# Patient Record
Sex: FEMALE | Race: WHITE | NOT HISPANIC OR LATINO | Employment: FULL TIME | URBAN - METROPOLITAN AREA
[De-identification: names, ages, dates, MRNs, and addresses within clinical notes are randomized per-mention and may not be internally consistent; named-entity substitution may affect disease eponyms.]

---

## 2024-02-02 ENCOUNTER — OFFICE VISIT (OUTPATIENT)
Dept: PRIMARY CARE | Facility: CLINIC | Age: 64
End: 2024-02-02
Payer: COMMERCIAL

## 2024-02-02 VITALS
SYSTOLIC BLOOD PRESSURE: 118 MMHG | HEART RATE: 65 BPM | WEIGHT: 120 LBS | BODY MASS INDEX: 20.49 KG/M2 | DIASTOLIC BLOOD PRESSURE: 78 MMHG | HEIGHT: 64 IN

## 2024-02-02 DIAGNOSIS — Z00.00 ANNUAL PHYSICAL EXAM: Primary | ICD-10-CM

## 2024-02-02 DIAGNOSIS — M85.80 OSTEOPENIA, UNSPECIFIED LOCATION: ICD-10-CM

## 2024-02-02 DIAGNOSIS — Z86.010 HISTORY OF COLON POLYPS: ICD-10-CM

## 2024-02-02 DIAGNOSIS — Z12.31 SCREENING MAMMOGRAM FOR BREAST CANCER: ICD-10-CM

## 2024-02-02 DIAGNOSIS — Z12.11 ENCOUNTER FOR SCREENING FOR MALIGNANT NEOPLASM OF COLON: ICD-10-CM

## 2024-02-02 DIAGNOSIS — Z13.6 ENCOUNTER FOR SCREENING FOR CORONARY ARTERY DISEASE: ICD-10-CM

## 2024-02-02 PROCEDURE — 99386 PREV VISIT NEW AGE 40-64: CPT | Performed by: INTERNAL MEDICINE

## 2024-02-02 PROCEDURE — 1036F TOBACCO NON-USER: CPT | Performed by: INTERNAL MEDICINE

## 2024-02-02 ASSESSMENT — ENCOUNTER SYMPTOMS
LOSS OF SENSATION IN FEET: 0
DEPRESSION: 0
OCCASIONAL FEELINGS OF UNSTEADINESS: 0

## 2024-02-02 NOTE — PROGRESS NOTES
"Subjective   Patient ID: Daisy Man is a 63 y.o. female who presents for Annual Exam.    HPI   Patient is a 63-year-old  female who comes to establish primary care.  She is due for an annual wellness exam, lab work, mammogram as well as colonoscopy.  She brings her past immunization records from [place] and these will be reviewed and updated in the medical record.  Colonoscopy done in 2/2019 - polypectomy done and 3 year F/U was recommended.  Patient is agreeable to getting CT cardiac scoring for screening purposes.  She lives with her , eats a healthy diet and remains active.  Pt denies, fever, chills, CP, SOB, abdominal pain, N/V/D/C or  symptoms. No HA, dizziness, numbness or weakness.  No unintentional loss of weight, loss of appetite, melena, rectal bleeding.  Patient did have some anxiety issues a couple years ago during the pandemic which she does not believe this is a significant issue or requires any drug treatment.  Sleep is variable but not a significant problem and patient declines drug therapy.  Review of Systems  As per HPI. S/P hysterectomy for fibroids 11 years ago.  Father has H/O CAD, S/P stent.  Objective   /78 (BP Location: Right arm, Patient Position: Sitting, BP Cuff Size: Large adult)   Pulse 65   Ht 1.626 m (5' 4\")   Wt 54.4 kg (120 lb)   BMI 20.60 kg/m²     Physical Exam  General - Patient is a well developed, well appearing,small built, middle aged CF in no acute respiratory distress  Head - normocephalic and atraumatic  Eyes - no pallor or icterus and normal extraocular movements  ENT - normal EACs and TMs, throat clear with no exudates  Neck - supple, no JVD, thyromegaly or lymphadenopathy  Heart - normal rate and rhythm with no murmurs  Lungs - clear to auscultation bilaterally  Breasts - no masses felt in either breast and nipples are normal  Abdomen - soft, non-tender, normal BS with no masses or organomegaly  Extremities - no pedal edema  Skin - " no rashes or ulcers  Neuro - grossly normal with no focal neurological deficits  Psych - normal mental status, mood and affect  MSK - normal gait with grossly normal ROM of major joints   Assessment/Plan        1.  Annual wellness exam-routine labs, colonoscopy, mammogram and bone DEXA have been ordered, patient is status post hysterectomy and does not get Pap smears anymore  2.  History of colon polyps-colonoscopy has been ordered  3.  Screening for CAD-CT cardiac scoring ordered  4.  History of fracture of the foot, patient has had low bone mass on previous DEXA-vitamin D 25-hydroxy level and repeat bone DEXA will be ordered  Follow-up in 1 year.  32 minutes spent rooming the patient, reviewing records, eliciting history, examining patient, counseling, coordination of care and in documentation.  This note was partially generated using the Dragon voice recognition system. There may be some incorrect words, spelling and punctuation errors that were not corrected prior to committing the note to the patient's medical record.

## 2024-02-05 ENCOUNTER — LAB (OUTPATIENT)
Dept: LAB | Facility: LAB | Age: 64
End: 2024-02-05
Payer: COMMERCIAL

## 2024-02-05 DIAGNOSIS — M85.80 OSTEOPENIA, UNSPECIFIED LOCATION: ICD-10-CM

## 2024-02-05 DIAGNOSIS — Z00.00 ANNUAL PHYSICAL EXAM: ICD-10-CM

## 2024-02-05 LAB
25(OH)D3 SERPL-MCNC: 48 NG/ML (ref 30–100)
ALBUMIN SERPL BCP-MCNC: 4.5 G/DL (ref 3.4–5)
ALP SERPL-CCNC: 64 U/L (ref 33–136)
ALT SERPL W P-5'-P-CCNC: 15 U/L (ref 7–45)
ANION GAP SERPL CALC-SCNC: 12 MMOL/L (ref 10–20)
APPEARANCE UR: CLEAR
AST SERPL W P-5'-P-CCNC: 16 U/L (ref 9–39)
BILIRUB SERPL-MCNC: 0.6 MG/DL (ref 0–1.2)
BILIRUB UR STRIP.AUTO-MCNC: NEGATIVE MG/DL
BUN SERPL-MCNC: 17 MG/DL (ref 6–23)
CALCIUM SERPL-MCNC: 9.5 MG/DL (ref 8.6–10.6)
CHLORIDE SERPL-SCNC: 107 MMOL/L (ref 98–107)
CHOLEST SERPL-MCNC: 174 MG/DL (ref 0–199)
CHOLESTEROL/HDL RATIO: 2.3
CO2 SERPL-SCNC: 27 MMOL/L (ref 21–32)
COLOR UR: YELLOW
CREAT SERPL-MCNC: 0.77 MG/DL (ref 0.5–1.05)
EGFRCR SERPLBLD CKD-EPI 2021: 88 ML/MIN/1.73M*2
ERYTHROCYTE [DISTWIDTH] IN BLOOD BY AUTOMATED COUNT: 13.2 % (ref 11.5–14.5)
EST. AVERAGE GLUCOSE BLD GHB EST-MCNC: 103 MG/DL
GLUCOSE SERPL-MCNC: 92 MG/DL (ref 74–99)
GLUCOSE UR STRIP.AUTO-MCNC: NEGATIVE MG/DL
HBA1C MFR BLD: 5.2 %
HCT VFR BLD AUTO: 38.1 % (ref 36–46)
HCV AB SER QL: NONREACTIVE
HDLC SERPL-MCNC: 74.7 MG/DL
HGB BLD-MCNC: 12.5 G/DL (ref 12–16)
HIV 1+2 AB+HIV1 P24 AG SERPL QL IA: ABNORMAL
KETONES UR STRIP.AUTO-MCNC: NEGATIVE MG/DL
LDLC SERPL CALC-MCNC: 89 MG/DL
LEUKOCYTE ESTERASE UR QL STRIP.AUTO: NEGATIVE
MCH RBC QN AUTO: 31.1 PG (ref 26–34)
MCHC RBC AUTO-ENTMCNC: 32.8 G/DL (ref 32–36)
MCV RBC AUTO: 95 FL (ref 80–100)
NITRITE UR QL STRIP.AUTO: NEGATIVE
NON HDL CHOLESTEROL: 99 MG/DL (ref 0–149)
NRBC BLD-RTO: 0 /100 WBCS (ref 0–0)
PH UR STRIP.AUTO: 6 [PH]
PLATELET # BLD AUTO: 202 X10*3/UL (ref 150–450)
POTASSIUM SERPL-SCNC: 4.3 MMOL/L (ref 3.5–5.3)
PROT SERPL-MCNC: 6.4 G/DL (ref 6.4–8.2)
PROT UR STRIP.AUTO-MCNC: NEGATIVE MG/DL
RBC # BLD AUTO: 4.02 X10*6/UL (ref 4–5.2)
RBC # UR STRIP.AUTO: NEGATIVE /UL
SODIUM SERPL-SCNC: 142 MMOL/L (ref 136–145)
SP GR UR STRIP.AUTO: 1.01
TRIGL SERPL-MCNC: 54 MG/DL (ref 0–149)
TSH SERPL-ACNC: 1.79 MIU/L (ref 0.44–3.98)
UROBILINOGEN UR STRIP.AUTO-MCNC: <2 MG/DL
VLDL: 11 MG/DL (ref 0–40)
WBC # BLD AUTO: 4.4 X10*3/UL (ref 4.4–11.3)

## 2024-02-05 PROCEDURE — 86803 HEPATITIS C AB TEST: CPT

## 2024-02-05 PROCEDURE — 85027 COMPLETE CBC AUTOMATED: CPT

## 2024-02-05 PROCEDURE — 86703 HIV-1/HIV-2 1 RESULT ANTBDY: CPT

## 2024-02-05 PROCEDURE — 36415 COLL VENOUS BLD VENIPUNCTURE: CPT

## 2024-02-05 PROCEDURE — 80053 COMPREHEN METABOLIC PANEL: CPT

## 2024-02-05 PROCEDURE — 84443 ASSAY THYROID STIM HORMONE: CPT

## 2024-02-05 PROCEDURE — 82306 VITAMIN D 25 HYDROXY: CPT

## 2024-02-05 PROCEDURE — 80061 LIPID PANEL: CPT

## 2024-02-05 PROCEDURE — 83036 HEMOGLOBIN GLYCOSYLATED A1C: CPT

## 2024-02-05 PROCEDURE — 87389 HIV-1 AG W/HIV-1&-2 AB AG IA: CPT

## 2024-02-05 PROCEDURE — 87536 HIV-1 QUANT&REVRSE TRNSCRPJ: CPT

## 2024-02-05 PROCEDURE — 81003 URINALYSIS AUTO W/O SCOPE: CPT

## 2024-02-06 LAB — HIV 1 & 2 AB SERPL IA.RAPID: NEGATIVE

## 2024-02-07 LAB
HIV-INTEGRATED COMMENT: NORMAL
HIV1 RNA # PLAS NAA DL=20: NOT DETECTED {COPIES}/ML
HIV1 RNA SPEC NAA+PROBE-LOG#: NORMAL {LOG_COPIES}/ML

## 2024-02-08 ENCOUNTER — HOSPITAL ENCOUNTER (OUTPATIENT)
Dept: RADIOLOGY | Facility: CLINIC | Age: 64
Discharge: HOME | End: 2024-02-08
Payer: COMMERCIAL

## 2024-02-08 VITALS — WEIGHT: 119.93 LBS | BODY MASS INDEX: 20.47 KG/M2 | HEIGHT: 64 IN

## 2024-02-08 DIAGNOSIS — Z12.31 SCREENING MAMMOGRAM FOR BREAST CANCER: ICD-10-CM

## 2024-02-08 PROCEDURE — 77063 BREAST TOMOSYNTHESIS BI: CPT | Performed by: RADIOLOGY

## 2024-02-08 PROCEDURE — 77067 SCR MAMMO BI INCL CAD: CPT

## 2024-02-08 PROCEDURE — 77067 SCR MAMMO BI INCL CAD: CPT | Performed by: RADIOLOGY

## 2024-02-09 DIAGNOSIS — M81.0 AGE RELATED OSTEOPOROSIS, UNSPECIFIED PATHOLOGICAL FRACTURE PRESENCE: Primary | ICD-10-CM

## 2024-02-09 PROBLEM — M85.80 OSTEOPENIA: Status: ACTIVE | Noted: 2024-02-05

## 2024-02-09 PROBLEM — Z86.0100 HISTORY OF COLONIC POLYPS: Status: ACTIVE | Noted: 2024-02-09

## 2024-02-09 PROBLEM — Z86.010 HISTORY OF COLONIC POLYPS: Status: ACTIVE | Noted: 2024-02-09

## 2024-02-10 ENCOUNTER — HOSPITAL ENCOUNTER (OUTPATIENT)
Dept: RADIOLOGY | Facility: HOSPITAL | Age: 64
Discharge: HOME | End: 2024-02-10
Payer: COMMERCIAL

## 2024-02-10 DIAGNOSIS — Z13.6 ENCOUNTER FOR SCREENING FOR CORONARY ARTERY DISEASE: ICD-10-CM

## 2024-02-10 PROCEDURE — 75571 CT HRT W/O DYE W/CA TEST: CPT

## 2024-02-12 ENCOUNTER — HOSPITAL ENCOUNTER (OUTPATIENT)
Dept: RADIOLOGY | Facility: CLINIC | Age: 64
Discharge: HOME | End: 2024-02-12
Payer: COMMERCIAL

## 2024-02-12 DIAGNOSIS — M85.80 OSTEOPENIA, UNSPECIFIED LOCATION: ICD-10-CM

## 2024-02-12 PROCEDURE — 77080 DXA BONE DENSITY AXIAL: CPT

## 2024-02-12 PROCEDURE — 77080 DXA BONE DENSITY AXIAL: CPT | Performed by: RADIOLOGY

## 2024-03-06 DIAGNOSIS — Z86.010 HISTORY OF COLONIC POLYPS: Primary | ICD-10-CM

## 2024-03-06 RX ORDER — POLYETHYLENE GLYCOL 3350, SODIUM SULFATE ANHYDROUS, SODIUM BICARBONATE, SODIUM CHLORIDE, POTASSIUM CHLORIDE 236; 22.74; 6.74; 5.86; 2.97 G/4L; G/4L; G/4L; G/4L; G/4L
4000 POWDER, FOR SOLUTION ORAL ONCE
Qty: 4000 ML | Refills: 0 | Status: SHIPPED | OUTPATIENT
Start: 2024-03-06 | End: 2024-03-06

## 2024-03-18 ENCOUNTER — APPOINTMENT (OUTPATIENT)
Dept: GASTROENTEROLOGY | Facility: EXTERNAL LOCATION | Age: 64
End: 2024-03-18
Payer: COMMERCIAL

## 2024-04-08 ENCOUNTER — HOSPITAL ENCOUNTER (OUTPATIENT)
Dept: RADIOLOGY | Facility: CLINIC | Age: 64
Discharge: HOME | End: 2024-04-08
Payer: COMMERCIAL

## 2024-04-08 ENCOUNTER — OFFICE VISIT (OUTPATIENT)
Dept: PRIMARY CARE | Facility: CLINIC | Age: 64
End: 2024-04-08
Payer: COMMERCIAL

## 2024-04-08 VITALS
HEART RATE: 76 BPM | DIASTOLIC BLOOD PRESSURE: 70 MMHG | HEIGHT: 64 IN | WEIGHT: 119 LBS | SYSTOLIC BLOOD PRESSURE: 124 MMHG | BODY MASS INDEX: 20.32 KG/M2

## 2024-04-08 DIAGNOSIS — M25.512 ACUTE PAIN OF LEFT SHOULDER: ICD-10-CM

## 2024-04-08 DIAGNOSIS — R07.81 RIB PAIN ON RIGHT SIDE: ICD-10-CM

## 2024-04-08 DIAGNOSIS — M81.0 AGE RELATED OSTEOPOROSIS, UNSPECIFIED PATHOLOGICAL FRACTURE PRESENCE: ICD-10-CM
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
## 2024-04-08 PROCEDURE — 1036F TOBACCO NON-USER: CPT | Performed by: INTERNAL MEDICINE

## 2024-04-08 PROCEDURE — 73030 X-RAY EXAM OF SHOULDER: CPT | Mod: LT

## 2024-04-08 PROCEDURE — 71100 X-RAY EXAM RIBS UNI 2 VIEWS: CPT | Mod: RT

## 2024-04-08 PROCEDURE — 99214 OFFICE O/P EST MOD 30 MIN: CPT | Performed by: INTERNAL MEDICINE

## 2024-04-08 PROCEDURE — 73030 X-RAY EXAM OF SHOULDER: CPT | Mod: LEFT SIDE | Performed by: RADIOLOGY

## 2024-04-08 RX ORDER — MELOXICAM 7.5 MG/1
7.5 TABLET ORAL DAILY
Qty: 30 TABLET | Refills: 1 | Status: SHIPPED | OUTPATIENT
Start: 2024-04-08 | End: 2025-04-08

## 2024-04-08 RX ORDER — LIDOCAINE 50 MG/G
1 PATCH TOPICAL DAILY
Qty: 15 PATCH | Refills: 0 | Status: SHIPPED | OUTPATIENT
Start: 2024-04-08

## 2024-04-08 RX ORDER — POLYETHYLENE GLYCOL-3350 AND ELECTROLYTES 236; 6.74; 5.86; 2.97; 22.74 G/274.31G; G/274.31G; G/274.31G; G/274.31G; G/274.31G
POWDER, FOR SOLUTION ORAL
COMMUNITY
Start: 2024-03-29

## 2024-04-08 NOTE — PROGRESS NOTES
"Subjective   Patient ID: Daisy Man is a 63 y.o. female who presents for Injury (Shoulder and rib. After a fall).    HPI   Tiffanie is a 63-year-old  female who comes today for an acute visit.  Patient was walking her [employer] [place] about a week ago when she had an accidental fall-fell forward and hurt the right side of her ribs as well as left shoulder but did not sustain any injury to face/head.  Patient denies any headaches or LOC.  She has been experiencing pain in the right submammary area anterolaterally as well as in the left shoulder and complains of limited range of motion of the left shoulder as well.  No history of fever, chills, shortness of breath, cough, dizziness, palpitations, abdominal pain, nausea, vomiting, diarrhea.  Patient also wanted to discuss treatment for osteoporosis as per recent bone DEXA.    Review of Systems  As per [facility]  Objective   /70 (BP Location: Right arm, Patient Position: Sitting, BP Cuff Size: Large adult)   Pulse 76   Ht 1.626 m (5' 4\")   Wt 54 kg (119 lb)   BMI 20.43 kg/m²     Physical Exam  General - Patient is a well developed, well appearing,small built, middle aged CF in no acute respiratory distress  Head - normocephalic and atraumatic  Eyes - no pallor or icterus and normal extraocular movements  Heart - normal rate and rhythm with no murmurs  Lungs - clear to auscultation bilaterally  Chest wall-tenderness noted on the right 7th-8th rib area along anterior axillary line, no ecchymosis noted  Abdomen - soft, non-tender, normal BS with no masses or organomegaly  Extremities - no pedal edema  Skin - no rashes or ulcers  Neuro - grossly normal with no focal neurological deficits  Psych - normal mental status, mood and affect  MSK - normal gait, restricted range of motion of left shoulder  Assessment/Plan        1.  Status post accidental fall-no head injury or loss of consciousness  2.  Right-sided rib pain after fall-suspect rib fracture " especially since patient has osteoporosis, x-ray of the right-sided ribs will be checked, topical lidocaine patch will be prescribed  3.  Left shoulder pain since fall-x-ray of the left shoulder has been ordered, meloxicam 7.5 mg daily will be prescribed and patient will be referred to physical therapy to improve range of motion  4.  Osteoporosis-bisphosphonate therapy and side effects of this treatment have been discussed with patient but she is reluctant to start this medication, she will be referred to rheumatology to discuss other options to treat her osteoporosis  30 minutes spent rooming the patient, reviewing records, eliciting history, examining patient, counseling, coordination of care and in documentation.  This note was partially generated using the Dragon voice recognition system. There may be some incorrect words, spelling and punctuation errors that were not corrected prior to committing the note to the patient's medical record.

## 2024-04-10 ENCOUNTER — EVALUATION (OUTPATIENT)
Dept: PHYSICAL THERAPY | Facility: CLINIC | Age: 64
End: 2024-04-10
Payer: COMMERCIAL

## 2024-04-10 DIAGNOSIS — R07.81 RIB PAIN ON RIGHT SIDE: ICD-10-CM

## 2024-04-10 DIAGNOSIS — M25.512 ACUTE PAIN OF LEFT SHOULDER: Primary | ICD-10-CM

## 2024-04-10 PROCEDURE — 97110 THERAPEUTIC EXERCISES: CPT | Mod: GP | Performed by: PHYSICAL THERAPIST

## 2024-04-10 PROCEDURE — 97161 PT EVAL LOW COMPLEX 20 MIN: CPT | Mod: GP | Performed by: PHYSICAL THERAPIST

## 2024-04-10 ASSESSMENT — ENCOUNTER SYMPTOMS
LOSS OF SENSATION IN FEET: 0
DEPRESSION: 0
OCCASIONAL FEELINGS OF UNSTEADINESS: 0

## 2024-04-10 NOTE — PROGRESS NOTES
"Physical Therapy Evaluation    Patient Name: Daisy Man  MRN: [id number]  Today's Date: 4/10/2024  Visit:1  Referred by: Dr. Bryant  Diagnosis:  low levels complexity evaluation  1. Acute pain of left shoulder        PRECAUTIONS:   none  She does not have any personal issues that would interfere with the rehab process.    SUBJECTIVE:  63 y.o. english speaking female who 10 days ago was pulled down to ground by her dog and sustained R) ribs injuries and L) shoulder strain.  Symptoms have improved a lot each day and has full ROM now.  Feels a little weak.    Pain:  0-1/10 left anterior shoulder.  Home Living:  Pt. Lives with her  in their home.  Prior level of function:  No limitations    OBJECTIVE:  Full AROM of left shoulder with mild end range strain of all motions.  Mild ache with Abd in painful arc L) shoulder.  Mild ache and decreaesd strength with resisted Abd and ER of L) shoulder.  (-) Saleem impingement L) shoulder.  (-) apprehension, load and shift, cross body, ACJ shear and compression.  Outcome Measure:  SPADI- 10%g    ASSESSMENT:  R) RC shoulder strain with mild ache and weakness of cuff.  She requires PT to address these issues to allow a full return to ADLs.    TREATMENT:  - Therex:  Supine press  2x15  Side lying ABD  2x15  SL ER   1# 2x15  Bent over EXT   1# 2x15  Prone Scapular retraction  I's  5\" x 10    PATIENT EDUCATION:  HEP    PLAN:   Daily HEP  Rehab potential: good  Plan of care agreement: Y    GOALS:  Active       PT Problem       Pt will be independent with HEP       Start:  04/10/24    Expected End:  05/17/24            Pt will have improved shoulder pain free ROM       Start:  04/10/24    Expected End:  06/28/24            Pt will have improved UE strength       Start:  04/10/24    Expected End:  06/28/24            Pt will be able to use UE with all ADLs       Start:  04/10/24    Expected End:  06/28/24            Pt will have decreased reports of pain by at least 2 " levels using a VAS       Start:  04/10/24    Expected End:  05/17/24

## 2024-04-17 ENCOUNTER — OFFICE VISIT (OUTPATIENT)
Dept: GASTROENTEROLOGY | Facility: EXTERNAL LOCATION | Age: 64
End: 2024-04-17
Payer: COMMERCIAL

## 2024-04-17 DIAGNOSIS — Z12.11 ENCOUNTER FOR SCREENING FOR MALIGNANT NEOPLASM OF COLON: ICD-10-CM

## 2024-04-17 DIAGNOSIS — Z86.010 PERSONAL HISTORY OF COLONIC POLYPS: Primary | ICD-10-CM

## 2024-04-17 PROCEDURE — 45378 DIAGNOSTIC COLONOSCOPY: CPT | Performed by: INTERNAL MEDICINE

## 2024-04-26 ENCOUNTER — APPOINTMENT (OUTPATIENT)
Dept: PHYSICAL THERAPY | Facility: CLINIC | Age: 64
End: 2024-04-26
Payer: COMMERCIAL

## 2024-05-22 ENCOUNTER — TELEPHONE (OUTPATIENT)
Dept: PRIMARY CARE | Facility: CLINIC | Age: 64
End: 2024-05-22

## 2024-05-22 ENCOUNTER — OFFICE VISIT (OUTPATIENT)
Dept: RHEUMATOLOGY | Facility: CLINIC | Age: 64
End: 2024-05-22
Payer: COMMERCIAL

## 2024-05-22 ENCOUNTER — LAB (OUTPATIENT)
Dept: LAB | Facility: LAB | Age: 64
End: 2024-05-22
Payer: COMMERCIAL

## 2024-05-22 VITALS — DIASTOLIC BLOOD PRESSURE: 81 MMHG | SYSTOLIC BLOOD PRESSURE: 130 MMHG | BODY MASS INDEX: 20.08 KG/M2 | WEIGHT: 117 LBS

## 2024-05-22 DIAGNOSIS — M81.0 AGE RELATED OSTEOPOROSIS, UNSPECIFIED PATHOLOGICAL FRACTURE PRESENCE: ICD-10-CM

## 2024-05-22 DIAGNOSIS — M81.0 AGE RELATED OSTEOPOROSIS, UNSPECIFIED PATHOLOGICAL FRACTURE PRESENCE: Primary | ICD-10-CM

## 2024-05-22 LAB
MAGNESIUM SERPL-MCNC: 2.37 MG/DL (ref 1.6–2.4)
PHOSPHATE SERPL-MCNC: 4.3 MG/DL (ref 2.5–4.9)
PROT SERPL-MCNC: 6.7 G/DL (ref 6.4–8.2)
PTH-INTACT SERPL-MCNC: 35.5 PG/ML (ref 18.5–88)

## 2024-05-22 PROCEDURE — 36415 COLL VENOUS BLD VENIPUNCTURE: CPT

## 2024-05-22 PROCEDURE — 84100 ASSAY OF PHOSPHORUS: CPT

## 2024-05-22 PROCEDURE — 84155 ASSAY OF PROTEIN SERUM: CPT

## 2024-05-22 PROCEDURE — 83735 ASSAY OF MAGNESIUM: CPT

## 2024-05-22 PROCEDURE — 83970 ASSAY OF PARATHORMONE: CPT

## 2024-05-22 PROCEDURE — 82523 COLLAGEN CROSSLINKS: CPT

## 2024-05-22 PROCEDURE — 84165 PROTEIN E-PHORESIS SERUM: CPT | Performed by: INTERNAL MEDICINE

## 2024-05-22 PROCEDURE — 1036F TOBACCO NON-USER: CPT | Performed by: INTERNAL MEDICINE

## 2024-05-22 PROCEDURE — 84165 PROTEIN E-PHORESIS SERUM: CPT

## 2024-05-22 PROCEDURE — 99204 OFFICE O/P NEW MOD 45 MIN: CPT | Performed by: INTERNAL MEDICINE

## 2024-05-22 RX ORDER — ALENDRONATE SODIUM 70 MG/1
70 TABLET ORAL
Qty: 12 TABLET | Refills: 3 | Status: SHIPPED | OUTPATIENT
Start: 2024-05-22 | End: 2025-05-22

## 2024-05-22 RX ORDER — ALENDRONATE SODIUM 70 MG/1
70 TABLET ORAL
Qty: 12 TABLET | Refills: 3 | Status: SHIPPED | OUTPATIENT
Start: 2024-05-22 | End: 2024-05-22 | Stop reason: SDUPTHER

## 2024-05-22 NOTE — PROGRESS NOTES
Subjective  . Daisy Man is a 63 y.o. female who presents for New Patient Visit.    HPI.  63-year-old female with history of osteopenia, uterine fibroids s/p hysterectomy referred by primary care physician Dr. Awa Bryant MD in consultation for abnormal bone density.    She stated that she twisted her left ankle and fractured fifth metatarsal in 2018 when she was in [facility].  Bone density was consistent with osteopenia however no medical therapy was suggested.    She stated that she underwent hysterectomy at the age of 40.  Mother sister has severe osteoporosis.  She quit smoking about 6 years ago.  She drinks 3-4 servings of wine in a week.  She drinks 2 cups of coffee.  She takes multivitamin regularly.  She walks half mile per day.    DEXA scan obtained in February 2024 showed L1-L4 BMD of 0.916 g/cm² reflecting a T-score of -2.2, left total femur BMD of 0.684 g/cm² reflecting a T-score of -2.6 and left femoral neck BMD of 0.689 g/cm² reflecting a T-score of -2.5.    FRAX score is reported 18.3% for major osteoporotic fracture and 4.0% for hip fracture.    Patient suggested Fosamax however she has concerns of side effects.    Social history: She is .  He is a .  Family history and surgical history: As mentioned above.    Review of Systems   All other systems reviewed and are negative.    Objective     Blood pressure 130/81, weight 53.1 kg (117 lb).    Physical Exam.  Gen. AAO x3, NAD.  HEENT: No pallor or icterus, PERRLA, EOMI. Oropharynx is clear. MM moist,Parotid glands  not enlarged. No cervical lymphadenopathy .  Skin: No rashes.  Heart: S1, S2/ RRR. No murmurs or gallops.  Lungs: CTA B.  Abdomen: Soft, NT/ND, BS regular.  MSK: No.swelling or tenderness of the  upper or lower extremity joints with full ROM, Neck,spine and Matheus SI with out tenderness.  Neuro: CN II-XII intact. Sensation to touch intact.Strength 5/5 throughout. DTR 2+ and  symmetrical.  Psych:Appropriate mood and behavior  EXT: No edema    Assessment/Plan  . 63-year-old female with history of osteopenia, uterine fibroids s/p hysterectomy referred by primary care physician Dr. Awa Bryant MD in consultation for abnormal bone density.    #1: Postmenopausal osteoporosis.  -Bone density results reviewed and explained to the patient.  -Bisphosphonate mechanism of action and side effect discussed in length.  She is agreeable to begin Fosamax/alendronate.  She will call her primary care physician for prescription.  -Continue calcium and vitamin D.  -Continue weightbearing exercises.  -Labs look for secondary etiologies.  -Repeat DEXA scan in 2 years.     This note was partially generated using the Dragon Voice recognition system. There may be some incorrect wording, spelling and/or spelling errors or punctuation errors that were not corrected prior to committing the note to the medical record.    Problem List Items Addressed This Visit    None  Visit Diagnoses       Age related osteoporosis, unspecified pathological fracture presence    -  Primary    Relevant Orders    N-Telopeptide, Serum    Magnesium    Phosphorus    Parathyroid Hormone, Intact    Serum Protein Electrophoresis                 Active Ambulatory Problems     Diagnosis Date Noted    History of colonic polyps 02/09/2024    Osteopenia 02/05/2024    Acute pain of left shoulder 04/10/2024     Resolved Ambulatory Problems     Diagnosis Date Noted    No Resolved Ambulatory Problems     No Additional Past Medical History       Family History   Problem Relation Name Age of Onset    Heart failure Paternal Grandmother      Breast cancer Neg Hx         Past Surgical History:   Procedure Laterality Date    HYSTERECTOMY         Social History     Tobacco Use   Smoking Status Former    Types: Cigarettes   Smokeless Tobacco Never       Allergies  Patient has no known allergies.    Current Meds  Current Outpatient Medications    Medication Instructions    alendronate (FOSAMAX) 70 mg, oral, Every 7 days, Take in the morning with a full glass of water, on an empty stomach, and do not take anything else by mouth or lie down for the next 30 min.    GaviLyte-G 236-22.74-6.74 -5.86 gram solution     lidocaine (Lidoderm) 5 % patch 1 patch, transdermal, Daily, Remove & discard patch within 12 hours or as directed by MD.    meloxicam (MOBIC) 7.5 mg, oral, Daily        Kasi Hilario MD

## 2024-05-22 NOTE — LETTER
May 22, 2024     Awa Bryant MD  [address]  [facility], [facility] [address]  [facility] [address]    Patient: Daisy Man   YOB: 1960   Date of Visit: 5/22/2024       Dear Dr. Awa Bryant MD:    Thank you for referring Daisy Man to me for evaluation. Below are my notes for this consultation.  If you have questions, please do not hesitate to call me. I look forward to following your patient along with you.       Sincerely,     Kasi Hilario MD      CC: No Recipients  ______________________________________________________________________________________    Subjective . Daisy Man is a 63 y.o. female who presents for New Patient Visit.    HPI.  63-year-old female with history of osteopenia, uterine fibroids s/p hysterectomy referred by primary care physician Dr. Awa Bryant MD in consultation for abnormal bone density.    She stated that she twisted her left ankle and fractured fifth metatarsal in 2018 when she was in [facility].  Bone density was consistent with osteopenia however no medical therapy was suggested.    She stated that she underwent hysterectomy at the age of 40.  Mother sister has severe osteoporosis.  She quit smoking about 6 years ago.  She drinks 3-4 servings of wine in a week.  She drinks 2 cups of coffee.  She takes multivitamin regularly.  She walks half mile per day.    DEXA scan obtained in February 2024 showed L1-L4 BMD of 0.916 g/cm² reflecting a T-score of -2.2, left total femur BMD of 0.684 g/cm² reflecting a T-score of -2.6 and left femoral neck BMD of 0.689 g/cm² reflecting a T-score of -2.5.    FRAX score is reported 18.3% for major osteoporotic fracture and 4.0% for hip fracture.    Patient suggested Fosamax however she has concerns of side effects.    Social history: She is .  He is a .  Family history and surgical history: As mentioned above.    Review of Systems   All other systems reviewed and  are negative.    Objective    Blood pressure 130/81, weight 53.1 kg (117 lb).    Physical Exam.  Gen. AAO x3, NAD.  HEENT: No pallor or icterus, PERRLA, EOMI. Oropharynx is clear. MM moist,Parotid glands  not enlarged. No cervical lymphadenopathy .  Skin: No rashes.  Heart: S1, S2/ RRR. No murmurs or gallops.  Lungs: CTA B.  Abdomen: Soft, NT/ND, BS regular.  MSK: No.swelling or tenderness of the  upper or lower extremity joints with full ROM, Neck,spine and Matheus SI with out tenderness.  Neuro: CN II-XII intact. Sensation to touch intact.Strength 5/5 throughout. DTR 2+ and symmetrical.  Psych:Appropriate mood and behavior  EXT: No edema    Assessment/Plan . 63-year-old female with history of osteopenia, uterine fibroids s/p hysterectomy referred by primary care physician Dr. Awa Bryant MD in consultation for abnormal bone density.    #1: Postmenopausal osteoporosis.  -Bone density results reviewed and explained to the patient.  -Bisphosphonate mechanism of action and side effect discussed in length.  She is agreeable to begin Fosamax/alendronate.  She will call her primary care physician for prescription.  -Continue calcium and vitamin D.  -Continue weightbearing exercises.  -Labs look for secondary etiologies.  -Repeat DEXA scan in 2 years.     This note was partially generated using the Dragon Voice recognition system. There may be some incorrect wording, spelling and/or spelling errors or punctuation errors that were not corrected prior to committing the note to the medical record.    Problem List Items Addressed This Visit    None  Visit Diagnoses       Age related osteoporosis, unspecified pathological fracture presence    -  Primary    Relevant Orders    N-Telopeptide, Serum    Magnesium    Phosphorus    Parathyroid Hormone, Intact    Serum Protein Electrophoresis                 Active Ambulatory Problems     Diagnosis Date Noted   • History of colonic polyps 02/09/2024   • Osteopenia 02/05/2024   •  Acute pain of left shoulder 04/10/2024     Resolved Ambulatory Problems     Diagnosis Date Noted   • No Resolved Ambulatory Problems     No Additional Past Medical History       Family History   Problem Relation Name Age of Onset   • Heart failure Paternal Grandmother     • Breast cancer Neg Hx         Past Surgical History:   Procedure Laterality Date   • HYSTERECTOMY         Social History     Tobacco Use   Smoking Status Former   • Types: Cigarettes   Smokeless Tobacco Never       Allergies  Patient has no known allergies.    Current Meds  Current Outpatient Medications   Medication Instructions   • alendronate (FOSAMAX) 70 mg, oral, Every 7 days, Take in the morning with a full glass of water, on an empty stomach, and do not take anything else by mouth or lie down for the next 30 min.   • GaviLyte-G 236-22.74-6.74 -5.86 gram solution    • lidocaine (Lidoderm) 5 % patch 1 patch, transdermal, Daily, Remove & discard patch within 12 hours or as directed by MD.   • meloxicam (MOBIC) 7.5 mg, oral, Daily        Kasi Hilario MD

## 2024-05-22 NOTE — TELEPHONE ENCOUNTER
Patient saw Dr. Mariano and discussed options for osteoporosis and would like you to call in   Fosamax to her pharmacy    [facility]   [phone]

## 2024-05-22 NOTE — PATIENT INSTRUCTIONS
Begin alendronate once a week.  Continue calcium and vitamin D.  Continue weightbearing exercises.  We will call you with results if abnormal.  Follow-up as needed.

## 2024-05-24 LAB
ALBUMIN: 4.3 G/DL (ref 3.4–5)
ALPHA 1 GLOBULIN: 0.2 G/DL (ref 0.2–0.6)
ALPHA 2 GLOBULIN: 0.7 G/DL (ref 0.4–1.1)
BETA GLOBULIN: 0.8 G/DL (ref 0.5–1.2)
GAMMA GLOBULIN: 0.7 G/DL (ref 0.5–1.4)
PATH REVIEW-SERUM PROTEIN ELECTROPHORESIS: NORMAL
PROTEIN ELECTROPHORESIS COMMENT: NORMAL

## 2024-05-27 LAB — COLLAGEN NTX SER-SCNC: 23.4 NM BCE

## 2024-06-24 ENCOUNTER — TELEPHONE (OUTPATIENT)
Dept: PRIMARY CARE | Facility: CLINIC | Age: 64
End: 2024-06-24
Payer: COMMERCIAL

## 2024-06-24 NOTE — TELEPHONE ENCOUNTER
Patient called has new insurance    Alendroate 70mg #12 take 1 tablet by mouth every 7 day    [facility]   RX [id number]  PCN [id number]  GRP [id number]    ID [id number]

## 2024-07-01 DIAGNOSIS — M81.0 AGE RELATED OSTEOPOROSIS, UNSPECIFIED PATHOLOGICAL FRACTURE PRESENCE: ICD-10-CM

## 2024-07-01 RX ORDER — ALENDRONATE SODIUM 70 MG/1
70 TABLET ORAL
Qty: 12 TABLET | Refills: 3 | Status: SHIPPED | OUTPATIENT
Start: 2024-07-01 | End: 2025-07-01

## 2024-07-30 ENCOUNTER — DOCUMENTATION (OUTPATIENT)
Dept: PHYSICAL THERAPY | Facility: CLINIC | Age: 64
End: 2024-07-30
Payer: COMMERCIAL

## 2024-07-30 NOTE — PROGRESS NOTES
Physical Therapy    Discharge Summary    Name: Daisy Man  MRN: [id number]  : 1960  Date: 24    Discharge Summary: PT    Discharge Information: Date of discharge 24, Date of last visit 4-10-24, Date of evaluation 4-10-24, Number of attended visits 1, Referred by Dr. powers, and Referred for shoulder    Therapy Summary: Pt. Was seen for initial evaluation for her shoulder    Discharge Status: unknown      Rehab Discharge Reason: Failed to schedule and/or keep follow-up appointment(s)

## 2024-10-16 ENCOUNTER — CLINICAL SUPPORT (OUTPATIENT)
Dept: PRIMARY CARE | Facility: CLINIC | Age: 64
End: 2024-10-16

## 2024-10-16 DIAGNOSIS — Z23 NEED FOR INFLUENZA VACCINATION: ICD-10-CM

## 2024-10-16 PROCEDURE — 90656 IIV3 VACC NO PRSV 0.5 ML IM: CPT | Performed by: INTERNAL MEDICINE

## 2024-10-16 PROCEDURE — 90471 IMMUNIZATION ADMIN: CPT | Performed by: INTERNAL MEDICINE

## 2025-05-27 DIAGNOSIS — M81.0 AGE RELATED OSTEOPOROSIS, UNSPECIFIED PATHOLOGICAL FRACTURE PRESENCE: ICD-10-CM

## 2025-05-27 RX ORDER — ALENDRONATE SODIUM 70 MG/1
TABLET ORAL
Qty: 12 TABLET | Refills: 3 | Status: SHIPPED | OUTPATIENT
Start: 2025-05-27